# Patient Record
Sex: FEMALE | Race: WHITE | NOT HISPANIC OR LATINO | URBAN - METROPOLITAN AREA
[De-identification: names, ages, dates, MRNs, and addresses within clinical notes are randomized per-mention and may not be internally consistent; named-entity substitution may affect disease eponyms.]

---

## 2019-05-28 ENCOUNTER — ON CAMPUS - OUTPATIENT (AMBULATORY)
Dept: URBAN - METROPOLITAN AREA HOSPITAL 2 | Facility: HOSPITAL | Age: 59
End: 2019-05-28
Payer: COMMERCIAL

## 2019-05-28 VITALS
SYSTOLIC BLOOD PRESSURE: 130 MMHG | DIASTOLIC BLOOD PRESSURE: 84 MMHG | DIASTOLIC BLOOD PRESSURE: 90 MMHG | TEMPERATURE: 97.5 F | RESPIRATION RATE: 16 BRPM | HEART RATE: 72 BPM | HEIGHT: 64 IN | SYSTOLIC BLOOD PRESSURE: 141 MMHG | SYSTOLIC BLOOD PRESSURE: 125 MMHG | HEART RATE: 84 BPM | RESPIRATION RATE: 18 BRPM | DIASTOLIC BLOOD PRESSURE: 73 MMHG | DIASTOLIC BLOOD PRESSURE: 68 MMHG | DIASTOLIC BLOOD PRESSURE: 83 MMHG | HEART RATE: 50 BPM | HEART RATE: 71 BPM | HEART RATE: 81 BPM | OXYGEN SATURATION: 100 % | HEART RATE: 64 BPM | DIASTOLIC BLOOD PRESSURE: 86 MMHG | DIASTOLIC BLOOD PRESSURE: 50 MMHG | RESPIRATION RATE: 19 BRPM | HEART RATE: 69 BPM | SYSTOLIC BLOOD PRESSURE: 153 MMHG | SYSTOLIC BLOOD PRESSURE: 129 MMHG | OXYGEN SATURATION: 99 % | WEIGHT: 178 LBS | SYSTOLIC BLOOD PRESSURE: 97 MMHG

## 2019-05-28 DIAGNOSIS — Z15.09 GENETIC SUSCEPTIBILITY TO OTHER MALIGNANT NEOPLASM: ICD-10-CM

## 2019-05-28 DIAGNOSIS — K64.1 SECOND DEGREE HEMORRHOIDS: ICD-10-CM

## 2019-05-28 PROCEDURE — 45378 DIAGNOSTIC COLONOSCOPY: CPT | Performed by: INTERNAL MEDICINE

## 2019-08-27 ENCOUNTER — OFFICE VISIT (OUTPATIENT)
Dept: CARDIOLOGY | Facility: CLINIC | Age: 59
End: 2019-08-27

## 2019-08-27 VITALS
SYSTOLIC BLOOD PRESSURE: 127 MMHG | OXYGEN SATURATION: 97 % | HEART RATE: 61 BPM | HEIGHT: 64 IN | DIASTOLIC BLOOD PRESSURE: 80 MMHG | BODY MASS INDEX: 31.07 KG/M2 | WEIGHT: 182 LBS | RESPIRATION RATE: 18 BRPM

## 2019-08-27 DIAGNOSIS — E78.2 MIXED HYPERLIPIDEMIA: ICD-10-CM

## 2019-08-27 DIAGNOSIS — I10 ESSENTIAL HYPERTENSION: Primary | ICD-10-CM

## 2019-08-27 PROCEDURE — 99214 OFFICE O/P EST MOD 30 MIN: CPT | Performed by: INTERNAL MEDICINE

## 2019-08-27 PROCEDURE — 93000 ELECTROCARDIOGRAM COMPLETE: CPT | Performed by: INTERNAL MEDICINE

## 2019-08-27 RX ORDER — CETIRIZINE HYDROCHLORIDE 10 MG/1
10 TABLET ORAL DAILY
COMMUNITY

## 2019-08-27 NOTE — PROGRESS NOTES
Subjective:     Encounter Date:08/27/2019      Patient ID: Rochelle Gaspar is a 59 y.o. female.    Chief Complaint:      Dear Dr. uCco Almonte,    It is my pleasure seeing patient Rochelle Gaspar  in the office today.  she is a pleasant 59-year-old female that was hospitalized to the [facility] with a episode of confusion and also  CVA.  patient had a known prior history of DVT and also history of hypercoagulable state and prior history of MI and LV apical thrombus.  repeat echocardiogram this hospitalization did not show any evidence of any LV apical thrombus normal LV systolic function.    she had  neurological workup with a normal CT \ MRI  showed a tiny focal infract.  patient is currently being treated for possible embolic source and anticoagulation.  patient 24 hour Holter monitor did not show any evidence of any atrial fibrillation she was advised to have a event monitor for 3-4 weeks but patient is reluctant.     Patient is going to be on chronic oral anticoagulation because of the hypercoagulable state state with factor 5 Leiden and protein S deficiency    Patient is currently unable to tolerate statins, risk and benefits of long-term oral anticoagulation discussed with the patient  Labs discussed with the patient   significant hyperlipidemia   tried multiple statins in the past with severe intolerance   patient is advised to try a PCSK 9 inhibitor/he likes to wait at this time and try diet and exercise  Repeat lipids with primary care physician office  Patient is also concerned about bleeding issues with anticoagulation therapy  Efforts and alternatives discussed with the patient  Advised patient to avoid any anti-inflammatory medications  Follow-up in office in 6 months          The following portions of the patient's history were reviewed and updated as appropriate: allergies, current medications, past family history, past medical history, past social history, past surgical history and  problem list.    No Known Allergies      Current Outpatient Medications:   •  cetirizine (zyrTEC) 10 MG tablet, Take 10 mg by mouth Daily., Disp: , Rfl:   •  rivaroxaban (XARELTO) 20 MG tablet, Take 20 mg by mouth Daily With Dinner., Disp: , Rfl:     Family History   Problem Relation Age of Onset   • Lung cancer Father    • Clotting disorder Father    • Thyroid disease Sister        Past Medical History:   Diagnosis Date   • [place]    • Blood clotting disorder (CMS/HCC)     Genetic, Factor 5   • DVT (deep venous thrombosis) (CMS/HCC)    • Hyperlipidemia    • Stroke (CMS/HCC)     TIA       Past Surgical History:   Procedure Laterality Date   •  SECTION         Social History     Socioeconomic History   • Marital status:      Spouse name: Not on file   • Number of children: Not on file   • Years of education: Not on file   • Highest education level: Not on file   Tobacco Use   • Smoking status: Never Smoker   • Smokeless tobacco: Never Used   Substance and Sexual Activity   • Alcohol use: Yes     Comment: Rare   • Drug use: No       Review of Systems   Constitution: Negative for chills, decreased appetite and malaise/fatigue.   HENT: Negative for congestion.    Eyes: Negative for blurred vision and double vision.   Cardiovascular: Negative for chest pain, dyspnea on exertion, irregular heartbeat, leg swelling, near-syncope, orthopnea, palpitations, paroxysmal nocturnal dyspnea and syncope.   Respiratory: Negative for cough and shortness of breath.    Hematologic/Lymphatic: Negative for adenopathy. Does not bruise/bleed easily.   Skin: Negative for rash.   Gastrointestinal: Negative for bloating and abdominal pain.   Neurological: Negative for dizziness and focal weakness.            Objective:         Vitals:    19 0833   BP: 127/80   Pulse: 61   Resp: 18   SpO2: 97%       Physical Exam   Constitutional: She is oriented to person, place, and time. She appears well-developed and well-nourished.    HENT:   Head: Normocephalic and atraumatic.   Eyes: Conjunctivae are normal. Pupils are equal, round, and reactive to light.   Neck: Normal range of motion. Neck supple. No thyromegaly present.   Cardiovascular: Normal rate, regular rhythm, S1 normal, S2 normal and intact distal pulses.   Pulmonary/Chest: Effort normal and breath sounds normal.   Abdominal: Soft. Bowel sounds are normal.   Musculoskeletal: She exhibits no edema.   Neurological: She is alert and oriented to person, place, and time.   Skin: Skin is warm.   Nursing note and vitals reviewed.    EKG: normal EKG, normal sinus rhythm, unchanged from previous tracings, normal sinus rhythm, nonspecific ST and T waves changes.

## 2019-09-26 ENCOUNTER — OFFICE VISIT (OUTPATIENT)
Dept: FAMILY MEDICINE CLINIC | Facility: CLINIC | Age: 59
End: 2019-09-26

## 2019-09-26 VITALS
OXYGEN SATURATION: 98 % | BODY MASS INDEX: 31.07 KG/M2 | DIASTOLIC BLOOD PRESSURE: 88 MMHG | SYSTOLIC BLOOD PRESSURE: 133 MMHG | HEART RATE: 61 BPM | WEIGHT: 182 LBS | HEIGHT: 64 IN

## 2019-09-26 DIAGNOSIS — Z12.31 SCREENING MAMMOGRAM, ENCOUNTER FOR: ICD-10-CM

## 2019-09-26 DIAGNOSIS — Z01.419 WELL FEMALE EXAM WITH ROUTINE GYNECOLOGICAL EXAM: Primary | ICD-10-CM

## 2019-09-26 PROBLEM — Z86.73 HISTORY OF CEREBROVASCULAR ACCIDENT: Status: ACTIVE | Noted: 2018-10-23

## 2019-09-26 PROBLEM — I21.9 MYOCARDIAL INFARCTION (HCC): Status: ACTIVE | Noted: 2018-08-23

## 2019-09-26 PROBLEM — E78.5 HYPERLIPIDEMIA: Status: ACTIVE | Noted: 2019-09-26

## 2019-09-26 PROBLEM — Z78.0 POSTMENOPAUSAL: Status: ACTIVE | Noted: 2018-08-23

## 2019-09-26 PROBLEM — Z00.00 ENCOUNTER FOR GENERAL ADULT MEDICAL EXAMINATION WITHOUT ABNORMAL FINDINGS: Status: ACTIVE | Noted: 2018-08-23

## 2019-09-26 PROBLEM — I25.2 HX OF ACUTE MYOCARDIAL INFARCTION: Status: ACTIVE | Noted: 2018-08-23

## 2019-09-26 PROCEDURE — 99396 PREV VISIT EST AGE 40-64: CPT | Performed by: NURSE PRACTITIONER

## 2019-09-26 PROCEDURE — G0148 SCR C/V CYTO, AUTOSYS, RESCR: HCPCS

## 2019-09-26 RX ORDER — MULTIVITAMIN
CAPSULE ORAL
COMMUNITY
Start: 2018-08-23 | End: 2026-09-30

## 2019-09-26 NOTE — PROGRESS NOTES
Subjective   Rochelle Gaspar is a 59 y.o. female.     Patient presents today for pap smear and well woman exam. No hx of abnormal paps and no concerns.       Gynecologic Exam   The patient's pertinent negatives include no genital itching, genital lesions, genital odor, genital rash, missed menses, pelvic pain, vaginal bleeding or vaginal discharge. Pertinent negatives include no abdominal pain, anorexia, back pain, chills, constipation, diarrhea, discolored urine, dysuria, fever, flank pain, frequency, headaches, hematuria, joint pain, joint swelling, nausea, painful intercourse, rash, sore throat, urgency or vomiting. She is sexually active.        The following portions of the patient's history were reviewed and updated as appropriate: allergies, current medications, past family history, past medical history, past social history, past surgical history and problem list.    Review of Systems   Constitutional: Negative for activity change, chills, diaphoresis, fatigue and fever.   HENT: Negative for congestion, ear pain, facial swelling, mouth sores, rhinorrhea, sinus pressure and sore throat.    Eyes: Negative for blurred vision, double vision, photophobia, pain and visual disturbance.   Respiratory: Negative for cough, choking, chest tightness, shortness of breath, wheezing and stridor.    Cardiovascular: Negative for chest pain, palpitations and leg swelling.   Gastrointestinal: Negative for abdominal distention, abdominal pain, anal bleeding, anorexia, blood in stool, constipation, diarrhea, nausea, rectal pain, vomiting, GERD and indigestion.   Endocrine: Negative for polydipsia, polyphagia and polyuria.   Genitourinary: Negative for amenorrhea, breast discharge, breast lump, breast pain, decreased libido, decreased urine volume, difficulty urinating, dyspareunia, dysuria, flank pain, frequency, genital sores, hematuria, menstrual problem, missed menses, pelvic pain, pelvic pressure, urgency, urinary  incontinence, vaginal bleeding, vaginal discharge and vaginal pain.   Musculoskeletal: Negative for arthralgias, back pain, joint pain and neck pain.   Skin: Negative for rash and skin lesions.   Neurological: Negative for dizziness, tremors, weakness, light-headedness and headache.   Psychiatric/Behavioral: Negative for agitation, behavioral problems, depressed mood and stress. The patient is not nervous/anxious.        Objective   Physical Exam   Constitutional: She is oriented to person, place, and time. She appears well-developed and well-nourished. No distress.   HENT:   Head: Normocephalic.   Eyes: Conjunctivae and EOM are normal. Pupils are equal, round, and reactive to light. Right eye exhibits no discharge. Left eye exhibits no discharge.   Neck: Normal range of motion. Neck supple.   Cardiovascular: Normal rate, regular rhythm, normal heart sounds and intact distal pulses. Exam reveals no gallop and no friction rub.   No murmur heard.  Pulmonary/Chest: Effort normal and breath sounds normal. No stridor. No respiratory distress. She has no wheezes. She has no rales. She exhibits no tenderness. Right breast exhibits no inverted nipple, no mass, no nipple discharge, no skin change and no tenderness. Left breast exhibits no inverted nipple, no mass, no nipple discharge, no skin change and no tenderness. Breasts are symmetrical. There is no breast swelling.   Abdominal: Soft. She exhibits no distension. There is no tenderness. A hernia is present.   Genitourinary: Rectum normal, vagina normal, uterus normal and cervix normal. Pelvic exam was performed with patient supine. No labial fusion. There is no rash, tenderness, lesion, injury or Bartholin's cyst on the right labia. There is no rash, tenderness, lesion, injury or Bartholin's cyst on the left labia.   Musculoskeletal: Normal range of motion.   Neurological: She is alert and oriented to person, place, and time.   Skin: Skin is warm and dry. She is not  diaphoretic. No erythema.   Psychiatric: She has a normal mood and affect. Her behavior is normal. Judgment and thought content normal.         Assessment/Plan   Rochelle was seen today for gynecologic exam.    Diagnoses and all orders for this visit:    Well female exam with routine gynecological exam  -     Pap IG, Rfx HPV ASCU; Future  -     Pap IG, Rfx HPV ASCU    Screening mammogram, encounter for  -     Mammo Screening Digital Tomosynthesis Bilateral With CAD

## 2019-10-01 LAB
CHROM ANALY OVERALL INTERP-IMP: NORMAL
CONV .: NORMAL
CONV .: NORMAL
CONV PERFORMED BY:: NORMAL
DX ICD CODE: NORMAL
HIV 1 & 2 AB SER-IMP: NORMAL
REF LAB TEST METHOD: NORMAL
STAT OF ADQ CVX/VAG CYTO-IMP: NORMAL

## 2020-03-03 ENCOUNTER — OFFICE VISIT (OUTPATIENT)
Dept: CARDIOLOGY | Facility: CLINIC | Age: 60
End: 2020-03-03

## 2020-03-03 VITALS
HEART RATE: 85 BPM | SYSTOLIC BLOOD PRESSURE: 141 MMHG | WEIGHT: 184 LBS | BODY MASS INDEX: 31.41 KG/M2 | RESPIRATION RATE: 18 BRPM | OXYGEN SATURATION: 97 % | DIASTOLIC BLOOD PRESSURE: 88 MMHG | HEIGHT: 64 IN

## 2020-03-03 DIAGNOSIS — Z86.73 HISTORY OF CEREBROVASCULAR ACCIDENT: ICD-10-CM

## 2020-03-03 DIAGNOSIS — D68.59 HYPERCOAGULABLE STATE (HCC): ICD-10-CM

## 2020-03-03 DIAGNOSIS — I21.9 MYOCARDIAL INFARCTION, UNSPECIFIED MI TYPE, UNSPECIFIED ARTERY (HCC): Primary | ICD-10-CM

## 2020-03-03 DIAGNOSIS — E78.2 MIXED HYPERLIPIDEMIA: ICD-10-CM

## 2020-03-03 DIAGNOSIS — I63.9 CEREBROVASCULAR ACCIDENT (CVA), UNSPECIFIED MECHANISM (HCC): ICD-10-CM

## 2020-03-03 PROCEDURE — 99213 OFFICE O/P EST LOW 20 MIN: CPT | Performed by: NURSE PRACTITIONER

## 2020-03-03 PROCEDURE — 93000 ELECTROCARDIOGRAM COMPLETE: CPT | Performed by: NURSE PRACTITIONER

## 2020-03-03 NOTE — PROGRESS NOTES
[facility] CARDIOLOGY      REASON FOR FOLLOW-UP:  History of MI  History of LV apical thrombus  Hypercoagulable state with factor 5 Leiden and protein S deficiency  History of CVA        Chief Complaint   Patient presents with   • Hypertension     6 month follow up          Dear Alyssia,    History of Present Illness     It was my pleasure to see Rochelle in the office today.  As you are aware, she is a 60 year-old female with known history of MI, LV apical thrombus, hypercoagulable state, history of CVA, history of DVT.  History of dyslipidemia.  She presents today in follow-up for the above-mentioned diagnostics.    Today, the patient reports that she feels very well.  Specifically, she denies any chest pains, pressure, tightness or palpitations.  She denies any shortness of breath, dyspnea with exertion, orthopnea or PND.  She denies any abnormal bleeding.    Patient is currently unable to tolerate statins.  Recommend patient try a PCSK 9 inhibitor, however she wants to wait at this time and try diet and exercise.    Assessment:  History of MI  History of CVA  Hypercoagulable state  History of LV apical thrombus  Coagulopathy on Xarelto    Plan:  I discussed option of event monitor.  Patient would like to defer at this time  We will get routine surveillance labs including lipids, CBC and BMP  Follow-up in 6 months or sooner if needed.        The following portions of the patient's history were reviewed and updated as appropriate: allergies, current medications, past family history, past medical history, past social history, past surgical history and problem list.    REVIEW OF SYSTEMS:    ROS    Vitals:    03/03/20 1408   BP: 141/88   Pulse: 85   Resp: 18   SpO2: 97%         PHYSICAL EXAM:    General: Alert, cooperative, no distress, appears stated age  Head:  Normocephalic, atraumatic, mucous membranes moist  Eyes:  Conjunctiva/corneas clear, EOM's intact     Neck:  Supple,  no JVD or bruit      Lungs: Clear to auscultation bilaterally, no wheezes rhonchi rales are noted  Chest wall: No tenderness  Musculoskeletal:   Ambulates freely without assistance  Heart::  Regular rate and rhythm, S1 and S2 normal, no murmur, rub or gallop  Abdomen: Soft, non-tender, nondistended bowel sounds active, no abdominal bruit  Extremities: No cyanosis, clubbing, or edema   Pulses: 2+ and symmetric all extremities  Skin:  No rashes or lesions  Neuro/psych: A&O x3. CN II through XII are grossly intact with appropriate affect        Past Medical History:   Diagnosis Date   • [place]    • Blood clotting disorder (CMS/HCC)     Genetic, Factor 5   • DVT (deep venous thrombosis) (CMS/HCC)    • Hyperlipidemia    • Stroke (CMS/HCC)     TIA       Past Surgical History:   Procedure Laterality Date   •  SECTION           Current Outpatient Medications:   •  cetirizine (zyrTEC) 10 MG tablet, Take 10 mg by mouth Daily., Disp: , Rfl:   •  GARLIC PO, GARLIC TABS, Disp: , Rfl:   •  Misc Natural Products (OSTEO BI-FLEX TRIPLE STRENGTH) tablet, OSTEO BI-FLEX TRIPLE STRENGTH TABS, Disp: , Rfl:   •  Multiple Vitamin (MULTIVITAMIN) capsule, MULTIVITAMINS CAPS, Disp: , Rfl:   •  rivaroxaban (XARELTO) 20 MG tablet, Take 1 tablet by mouth Daily With Dinner., Disp: 90 tablet, Rfl: 3  •  Vitamin C-Vitamin D-Zinc (D3/VITAMIN C/ZINC) tablet, D3/VITAMIN C/ZINC TABS, Disp: , Rfl:     No Known Allergies    Family History   Problem Relation Age of Onset   • Lung cancer Father    • Clotting disorder Father    • Thyroid disease Sister        Social History     Tobacco Use   • Smoking status: Never Smoker   • Smokeless tobacco: Never Used   Substance Use Topics   • Alcohol use: Yes     Comment: Rare           Current Electrocardiogram:    ECG 12 Lead  Date/Time: 3/3/2020 3:11 PM  Performed by: Genoveva Morales APRN  Authorized by: Genoveva Morales APRN   Comparison: not compared with previous ECG   Rhythm: sinus rhythm  BPM:  85                Genoveva Morales, BARRY  03/07/20  3:10 PM

## 2020-05-26 ENCOUNTER — OFFICE VISIT (OUTPATIENT)
Dept: FAMILY MEDICINE CLINIC | Facility: CLINIC | Age: 60
End: 2020-05-26

## 2020-05-26 VITALS
DIASTOLIC BLOOD PRESSURE: 89 MMHG | HEIGHT: 64 IN | SYSTOLIC BLOOD PRESSURE: 126 MMHG | BODY MASS INDEX: 32.1 KG/M2 | HEART RATE: 81 BPM | OXYGEN SATURATION: 98 % | WEIGHT: 188 LBS | TEMPERATURE: 97.7 F

## 2020-05-26 DIAGNOSIS — M54.2 NECK PAIN: ICD-10-CM

## 2020-05-26 DIAGNOSIS — Z23 NEED FOR VACCINATION: ICD-10-CM

## 2020-05-26 DIAGNOSIS — M25.511 RIGHT SHOULDER PAIN, UNSPECIFIED CHRONICITY: ICD-10-CM

## 2020-05-26 DIAGNOSIS — E78.2 MIXED HYPERLIPIDEMIA: Primary | ICD-10-CM

## 2020-05-26 PROCEDURE — 90471 IMMUNIZATION ADMIN: CPT | Performed by: FAMILY MEDICINE

## 2020-05-26 PROCEDURE — 90732 PPSV23 VACC 2 YRS+ SUBQ/IM: CPT | Performed by: FAMILY MEDICINE

## 2020-05-26 PROCEDURE — 90472 IMMUNIZATION ADMIN EACH ADD: CPT | Performed by: FAMILY MEDICINE

## 2020-05-26 PROCEDURE — 90715 TDAP VACCINE 7 YRS/> IM: CPT | Performed by: FAMILY MEDICINE

## 2020-05-26 PROCEDURE — 99214 OFFICE O/P EST MOD 30 MIN: CPT | Performed by: FAMILY MEDICINE

## 2020-05-26 NOTE — PROGRESS NOTES
Subjective   Rochelle Gaspar is a 60 y.o. female.     60-year-old female patient present with [facility] care.   The patient complaining of right shoulder pain.  The symptoms  started 4 to 6 months ago.  There was no injury mechanism. The quality of the pain is described as aching. The pain is at a severity of 4/10. The pain is moderate. Pertinent negatives include no chest pain, muscle weakness, numbness or tingling. The symptoms are aggravated by movement and overhead lifting. She has tried NSAIDs for the symptoms.       Neck Pain    This is a chronic problem. The current episode started more than 1 year ago. The problem occurs daily. The problem has been gradually worsening. The pain is present in the right side. The pain is at a severity of 5/10. The pain is moderate. The symptoms are aggravated by position. Pertinent negatives include no chest pain, fever, headaches, leg pain, pain with swallowing, photophobia, trouble swallowing or weakness. She has tried NSAIDs and home exercises for the symptoms. The treatment provided mild relief.   Hyperlipidemia   This is a chronic problem. The problem is controlled. She has no history of diabetes, hypothyroidism or liver disease. Pertinent negatives include no chest pain, leg pain, myalgias or shortness of breath. Current antihyperlipidemic treatment includes diet change and exercise.        The following portions of the patient's history were reviewed and updated as appropriate: past medical history, past social history, past surgical history and problem list.    Review of Systems   Constitutional: Negative for fatigue and fever.   HENT: Negative for ear pain, sinus pressure, sore throat and trouble swallowing.    Eyes: Negative for blurred vision and photophobia.   Respiratory: Negative for cough, shortness of breath and wheezing.    Cardiovascular: Negative for chest pain, palpitations and leg swelling.   Gastrointestinal: Negative for abdominal pain, diarrhea,  nausea, vomiting and GERD.   Endocrine: Negative for cold intolerance, polydipsia, polyphagia and polyuria.   Musculoskeletal: Positive for neck pain. Negative for back pain and myalgias.        Right shoulder pain   Skin: Negative for rash.   Neurological: Negative for dizziness, weakness and headache.   Psychiatric/Behavioral: Negative for sleep disturbance and depressed mood. The patient is not nervous/anxious.        Objective   Physical Exam   Constitutional: She is oriented to person, place, and time. She appears well-developed.   HENT:   Head: Normocephalic.   Eyes: Pupils are equal, round, and reactive to light. Conjunctivae and EOM are normal.   Neck: Normal range of motion. Neck supple.   Cardiovascular: Normal rate, regular rhythm and normal heart sounds.   Pulmonary/Chest: Effort normal and breath sounds normal. She has no wheezes.   Abdominal: Soft. Bowel sounds are normal. There is no tenderness.   Musculoskeletal: Normal range of motion.        Right shoulder: She exhibits normal range of motion and no tenderness.   Neurological: She is alert and oriented to person, place, and time.   Psychiatric: She has a normal mood and affect.   Vitals reviewed.        Assessment/Plan   Problems Addressed this Visit        Cardiovascular and Mediastinum    Hyperlipidemia - Primary     Lipid abnormalities are unchanged.  Nutritional counseling was provided.  Lipids will be reassessed in 6 months.            Nervous and Auditory    Right shoulder pain     Advised NSAIDs, home exercise, we will check shoulder x-ray.         Relevant Orders    XR Shoulder 2+ View Right    Neck pain right side     Discussed symptom management we will check  cervical x-ray.         Relevant Orders    XR Spine Cervical 2 or 3 View       Other    Need for vaccination    Relevant Medications    pneumococcal conj. 13-valent (PREVNAR-13) vaccine 0.5 mL (Completed)    Other Relevant Orders    Tdap Vaccine Greater Than or Equal To 8yo IM  (Completed)

## 2020-06-04 DIAGNOSIS — M50.30 DDD (DEGENERATIVE DISC DISEASE), CERVICAL: ICD-10-CM

## 2020-06-04 DIAGNOSIS — M54.2 NECK PAIN: Primary | ICD-10-CM

## 2020-07-21 ENCOUNTER — TELEPHONE (OUTPATIENT)
Dept: NEUROSURGERY | Facility: CLINIC | Age: 60
End: 2020-07-21

## 2020-07-21 NOTE — TELEPHONE ENCOUNTER
Pt returned Swathi's call about mri. Pt does not have an recent mri and if she needs to be rescheduled with Liyah the office will have to reschedule her since we are not allowed to do so at this time.      Pt contact# [phone]

## 2020-08-17 ENCOUNTER — OFFICE VISIT (OUTPATIENT)
Dept: NEUROSURGERY | Facility: CLINIC | Age: 60
End: 2020-08-17

## 2020-08-17 ENCOUNTER — TELEPHONE (OUTPATIENT)
Dept: NEUROSURGERY | Facility: CLINIC | Age: 60
End: 2020-08-17

## 2020-08-17 DIAGNOSIS — M54.12 CERVICAL RADICULOPATHY: ICD-10-CM

## 2020-08-17 DIAGNOSIS — M47.812 SPONDYLOSIS, CERVICAL: ICD-10-CM

## 2020-08-17 DIAGNOSIS — M54.2 NECK AND SHOULDER PAIN: Primary | ICD-10-CM

## 2020-08-17 DIAGNOSIS — M25.519 NECK AND SHOULDER PAIN: Primary | ICD-10-CM

## 2020-08-17 PROCEDURE — 99442 PR PHYS/QHP TELEPHONE EVALUATION 11-20 MIN: CPT | Performed by: NURSE PRACTITIONER

## 2020-08-17 NOTE — TELEPHONE ENCOUNTER
I called and left a message on patient's voicemail regarding where does she want to go for her mri.

## 2020-08-17 NOTE — PROGRESS NOTES
You have chosen to receive care through a telephone visit. Do you consent to use a telephone visit for your medical care today? Yes      Subjective   History of Present Illness: Rochelle Gaspar is a 60 y.o. female being seen via telehealth today for her neck pain.  Patient is new to clinic and I have reviewed her records.  Patient is a very pleasant 60-year-old that has been having neck pain for approximately 1 year.  She states that this pain is on the right side of her cervical spine and characterizes it as achy.  She states that she also has had some right shoulder pain that she does not know if is related to her neck or not.  Patient is also concerned because she does experience intermittent right hand and finger numbness while she drives.  This is self resolving.  Patient states that she has been undergoing some massages that have been helpful for her neck pain.  She states that she has become concerned since she had x-rays done in May, 2020 and it showed some degenerative issues and wanted to have them checked out.  She denies any bowel/bladder dysfunction, headaches, weakness,  balance issues or gait dysfunction.      Neck Pain    This is a chronic problem. The current episode started more than 1 year ago. The problem occurs constantly. The problem has been gradually improving. The pain is associated with nothing. The pain is present in the right side. The quality of the pain is described as aching. Nothing aggravates the symptoms. Associated symptoms include tingling. Pertinent negatives include no headaches, leg pain, photophobia or weakness. Treatments tried: massages. The treatment provided mild relief.       The following portions of the patient's history were reviewed and updated as appropriate: allergies, current medications, past family history, past medical history, past social history, past surgical history and problem list.    Review of Systems   Eyes: Negative for photophobia.   Musculoskeletal:  Positive for neck pain.   Neurological: Positive for tingling. Negative for weakness and headaches.   All other systems reviewed and are negative.      Objective     .There were no vitals taken for this visit.   There is no height or weight on file to calculate BMI.  No physical assessment performed due to telehealth visit  Patient is alert and oriented x3  Recenct and remote memory intact  Speech is articulate and coherent      Assessment/Plan   Independent Review of Radiographic Studies:      I personally reviewed the images from the following studies.    Cervical x-ray 5/26/2020    1.  Multi Level spondylosis  2.  Degenerative facet changes    Medical Decision Making:      Ms. Gaspar is a 60-year-old female being seen for neck pain via telehealth today.  She has a past medical history significant for DVT, TIA, blood clotting disorder (genetic factor V), and HLD.          Rochelle was seen today for neck pain.    Diagnoses and all orders for this visit:    Neck and shoulder pain    Cervical radiculopathy  -     MRI Cervical Spine Without Contrast; Future    Spondylosis, cervical      Return in about 1 month (around 9/17/2020).        I spent 20 minutes with the patient in direct communication reviewing patient symptoms and complaints and explaining the pathology and the treatment plan.  Patient understands and agrees with plan wishes to proceed.      Liyah Palomino, BARRY  08/17/20  12:30

## 2020-09-01 ENCOUNTER — OFFICE VISIT (OUTPATIENT)
Dept: CARDIOLOGY | Facility: CLINIC | Age: 60
End: 2020-09-01

## 2020-09-01 VITALS
BODY MASS INDEX: 32.1 KG/M2 | SYSTOLIC BLOOD PRESSURE: 132 MMHG | DIASTOLIC BLOOD PRESSURE: 84 MMHG | OXYGEN SATURATION: 98 % | HEART RATE: 73 BPM | WEIGHT: 187 LBS

## 2020-09-01 DIAGNOSIS — D68.59 HYPERCOAGULABLE STATE (HCC): ICD-10-CM

## 2020-09-01 DIAGNOSIS — I63.9 CEREBROVASCULAR ACCIDENT (CVA), UNSPECIFIED MECHANISM (HCC): ICD-10-CM

## 2020-09-01 DIAGNOSIS — I21.9 MYOCARDIAL INFARCTION, UNSPECIFIED MI TYPE, UNSPECIFIED ARTERY (HCC): ICD-10-CM

## 2020-09-01 DIAGNOSIS — I25.2 HX OF ACUTE MYOCARDIAL INFARCTION: Primary | ICD-10-CM

## 2020-09-01 DIAGNOSIS — E78.2 MIXED HYPERLIPIDEMIA: ICD-10-CM

## 2020-09-01 PROCEDURE — 93000 ELECTROCARDIOGRAM COMPLETE: CPT | Performed by: NURSE PRACTITIONER

## 2020-09-01 PROCEDURE — 99213 OFFICE O/P EST LOW 20 MIN: CPT | Performed by: NURSE PRACTITIONER

## 2020-09-01 NOTE — PROGRESS NOTES
[facility] CARDIOLOGY      REASON FOR FOLLOW-UP:  History of non-ST elevation MI  History of TIA  Dyslipidemia  History of LV thrombus            Chief Complaint   Patient presents with   • Hyperlipidemia     6mo f/u no cardiac complaints          Dear Dr. Maynard,        History of Present Illness     It was my pleasure to see Rochelle in the office today.  As you are aware, she is a 60 year-old female with known history of non-STEMI, LV apical thrombus, hypercoagulable state, history of CVA, history of DVT.  History of dyslipidemia.  She presents today in follow-up for the above-mentioned diagnostics.     Today, the patient reports that she feels very well.  Specifically, she denies any chest pains, pressure, tightness or palpitations.  She denies any shortness of breath, dyspnea with exertion, orthopnea or PND.  She denies any abnormal bleeding.    Patient is currently unable to tolerate statins.    PSK 9 where discussed with the patient prior office visit.  She prefers to try diet and exercise.  Patient is anticoagulated with Xarelto and denies any abnormal bleeding.  We again discussed event monitor should she have any palpitations.  She has declined further monitoring in the past.  Patient states she had labs at [facility] after last visit.     Assessment:  History of MI  History of CVA  Hypercoagulable state  History of LV apical thrombus  Coagulopathy on Xarelto     Plan:  Patient would like to defer any further heart monitoring.  We will try to get copies of labs from February  We will order surveillance labs  Follow-up in 6 months or sooner if needed.      The following portions of the patient's history were reviewed and updated as appropriate: allergies, current medications, past family history, past medical history, past social history, past surgical history and problem list.    REVIEW OF SYSTEMS:    Review of Systems   All other systems reviewed and are negative.      Vitals:     20 1317   BP: 132/84   Pulse: 73   SpO2: 98%         PHYSICAL EXAM:    General: Alert, cooperative, no distress, appears stated age  Head:  Normocephalic, atraumatic, mucous membranes moist  Eyes:  Conjunctiva/corneas clear, EOM's intact     Neck:  Supple,  no JVD or bruit     Lungs: Clear to auscultation bilaterally, no wheezes rhonchi rales are noted  Chest wall: No tenderness  Musculoskeletal:   Ambulates freely without assistance  Heart::  Regular rate and rhythm, S1 and S2 normal, no murmur, rub or gallop  Abdomen: Soft, non-tender, nondistended, bowel sounds active, no abdominal bruit  Extremities: No cyanosis, clubbing, or edema   Pulses: 2+ and symmetric all extremities  Skin:  No rashes or lesions  Neuro/psych: A&O x3. CN II through XII are grossly intact with appropriate affect        Past Medical History:   Diagnosis Date   • [place]    • Blood clotting disorder (CMS/HCC)     Genetic, Factor 5   • DVT (deep venous thrombosis) (CMS/HCC)    • Hyperlipidemia    • Stroke (CMS/HCC)     TIA       Past Surgical History:   Procedure Laterality Date   •  SECTION           Current Outpatient Medications:   •  cetirizine (zyrTEC) 10 MG tablet, Take 10 mg by mouth Daily., Disp: , Rfl:   •  GARLIC PO, GARLIC TABS, Disp: , Rfl:   •  Misc Natural Products (OSTEO BI-FLEX TRIPLE STRENGTH) tablet, OSTEO BI-FLEX TRIPLE STRENGTH TABS, Disp: , Rfl:   •  Multiple Vitamin (MULTIVITAMIN) capsule, MULTIVITAMINS CAPS, Disp: , Rfl:   •  rivaroxaban (XARELTO) 20 MG tablet, Take 1 tablet by mouth Daily With Dinner., Disp: 90 tablet, Rfl: 3  •  Vitamin C-Vitamin D-Zinc (D3/VITAMIN C/ZINC) tablet, D3/VITAMIN C/ZINC TABS, Disp: , Rfl:     No Known Allergies    Family History   Problem Relation Age of Onset   • Lung cancer Father    • Clotting disorder Father    • Thyroid disease Sister    • Atrial fibrillation Mother        Social History     Tobacco Use   • Smoking status: Never Smoker   • Smokeless tobacco: Never Used  "  Substance Use Topics   • Alcohol use: Yes     Comment: Rare           Current Electrocardiogram:    ECG 12 Lead  Date/Time: 9/1/2020 2:35 PM  Performed by: Genoveva Morales APRN  Authorized by: Genoveva Morales APRN   Comparison: not compared with previous ECG   Rhythm: sinus rhythm  BPM: 73  Conduction comments: Nonspecific ST wave changes                  EMR Dragon/Transcription:   \"Dictated utilizing Dragon dictation\".         "

## 2020-09-09 ENCOUNTER — TELEPHONE (OUTPATIENT)
Dept: NEUROSURGERY | Facility: CLINIC | Age: 60
End: 2020-09-09

## 2020-09-09 NOTE — TELEPHONE ENCOUNTER
PT IS CALLING BACK TO LET YOU KNOW THAT SHE DID HER MRI CERVICAL AT [facility] ON 8/28.  SUZETTE [phone]

## 2020-09-30 ENCOUNTER — TELEPHONE (OUTPATIENT)
Dept: NEUROSURGERY | Facility: CLINIC | Age: 60
End: 2020-09-30

## 2021-01-25 ENCOUNTER — OFFICE VISIT (OUTPATIENT)
Dept: NEUROSURGERY | Facility: CLINIC | Age: 61
End: 2021-01-25

## 2021-01-25 VITALS
BODY MASS INDEX: 32.44 KG/M2 | HEART RATE: 101 BPM | SYSTOLIC BLOOD PRESSURE: 141 MMHG | HEIGHT: 64 IN | DIASTOLIC BLOOD PRESSURE: 88 MMHG | WEIGHT: 190 LBS

## 2021-01-25 DIAGNOSIS — M25.519 NECK AND SHOULDER PAIN: Primary | ICD-10-CM

## 2021-01-25 DIAGNOSIS — M48.02 SPINAL STENOSIS IN CERVICAL REGION: ICD-10-CM

## 2021-01-25 DIAGNOSIS — M54.2 NECK AND SHOULDER PAIN: Primary | ICD-10-CM

## 2021-01-25 DIAGNOSIS — M47.812 FACET ARTHROPATHY, CERVICAL: ICD-10-CM

## 2021-01-25 PROCEDURE — 99213 OFFICE O/P EST LOW 20 MIN: CPT | Performed by: NURSE PRACTITIONER

## 2021-01-25 NOTE — PROGRESS NOTES
Subjective   History of Present Illness: Rochelle Gaspar is a 60 y.o. female being seen for follow-up of her neck pain with radiation in her right shoulder intermittently as well as numbness and tingling to her right hand and fingers while she drives.    Patient actually states that her symptoms have eased quite a bit.  She has been doing yoga which is helped with some of the tightness and neck pain.  She has no shoulder pain or numbness and tingling in her right arm.  She has some neck pain mostly on the right that she feels tightens up her muscles at times.    Neck Pain   This is a chronic problem. The current episode started more than 1 year ago. The problem occurs intermittently. The problem has been unchanged. The pain is associated with nothing. The pain is present in the right side, midline and left side. The quality of the pain is described as aching. The pain is mild. Nothing aggravates the symptoms. Pertinent negatives include no chest pain, fever, headaches, leg pain, numbness, photophobia, tingling, trouble swallowing, visual change or weakness. She has tried nothing for the symptoms.       The following portions of the patient's history were reviewed and updated as appropriate: allergies, current medications, past family history, past medical history, past social history, past surgical history and problem list.    Review of Systems   Constitutional: Negative for chills, fatigue and fever.   HENT: Positive for congestion. Negative for postnasal drip, sinus pressure, sinus pain, sneezing, sore throat and trouble swallowing.    Eyes: Negative for photophobia, pain, redness and visual disturbance.   Respiratory: Negative for cough, chest tightness, shortness of breath and wheezing.    Cardiovascular: Negative for chest pain and palpitations.   Gastrointestinal: Negative for abdominal pain, constipation, diarrhea, nausea and vomiting.   Genitourinary: Negative for difficulty urinating and pelvic pain.  "  Musculoskeletal: Positive for neck pain.   Skin: Negative for rash and wound.   Neurological: Negative for dizziness, tingling, tremors, seizures, syncope, weakness, light-headedness, numbness and headaches.   Psychiatric/Behavioral: Negative for behavioral problems, confusion, decreased concentration and hallucinations. The patient is not nervous/anxious.    All other systems reviewed and are negative.      Objective     ./88 (BP Location: Left arm, Patient Position: Sitting, Cuff Size: Adult)   Pulse 101   Ht 162.6 cm (64\")   Wt 86.2 kg (190 lb)   BMI 32.61 kg/m²    Body mass index is 32.61 kg/m².      Physical Exam  Vitals signs reviewed.   Eyes:      Pupils: Pupils are equal, round, and reactive to light.   Pulmonary:      Effort: Pulmonary effort is normal.   Neurological:      Mental Status: She is oriented to person, place, and time.      Coordination: Romberg Test normal.      Gait: Gait is intact.      Deep Tendon Reflexes: Strength normal.   Psychiatric:         Speech: Speech normal.       Neurologic Exam     Mental Status   Oriented to person, place, and time.   Speech: speech is normal   Level of consciousness: alert    Cranial Nerves     CN III, IV, VI   Pupils are equal, round, and reactive to light.    CN XI   CN XI normal.     Motor Exam     Strength   Strength 5/5 throughout.     Sensory Exam   Light touch normal.     Gait, Coordination, and Reflexes     Gait  Gait: normal    Coordination   Romberg: negative    Reflexes   Right plantar: normal  Left plantar: normal  Right Cooper: absent  Left Cooper: absent  Right ankle clonus: absent  Left ankle clonus: absent    Normal range of motion  Tender to palpation right trapezius  Negative Lhermitte's  Negative Spurling      Assessment/Plan   Independent Review of Radiographic Studies:      I personally reviewed the images from the following studies.    MRI cervical 8/28/2020    Mild multilevel degenerative changes most noted at C3-C4, " C4-C5, C5-C6.  These areas demonstrate mild bilateral facet arthropathy with no spinal canal stenosis and mild bilateral neuroforaminal stenosis at C5-C6 and mild right neuroforaminal stenosis C3-C4.    Medical Decision Making:    Patient clinical symptoms do correlate with facet arthropathy in her neck.  I recommended she continue with her yoga as it is helping.  Patient did not want to be placed on a muscle relaxer and cannot be placed on anti-inflammatory medication due to being on blood thinners.  She does not like taking prescription pain medication.  We did talk about some supplements to help reduce inflammation but told her to check with her primary care physician before starting any due to some side effects of some of these herbal supplements.  I also suggested patient apply some heat around her neck and shoulder area if she feels some tightness causing pain.  Patient can follow-up with us on as needed basis.      Procedures      Diagnoses and all orders for this visit:    1. Neck and shoulder pain (Primary)    2. Facet arthropathy, cervical    3. Spinal stenosis in cervical region      Return if symptoms worsen or fail to improve.    This patient was examined wearing appropriate personal protective equipment.     Patient has never used tobacco products. Discussed continued cessation/avoidance of tobacco products.    Smoking increases the risk of heart disease, lung disease, vascular disease, stroke, and cancer. If you smoke, STOP!    I did discuss a low-salt diet and exercise to improve BP in addition to taking presribed medications.    Patient's BMI is above goal.  I discussed healthy, low-fat diet and exercise to improve overall health and wellbeing.         Liyah Palomino, APRN  01/25/21  09:49 EST

## 2021-03-08 ENCOUNTER — OFFICE VISIT (OUTPATIENT)
Dept: CARDIOLOGY | Facility: CLINIC | Age: 61
End: 2021-03-08

## 2021-03-08 VITALS
WEIGHT: 185 LBS | SYSTOLIC BLOOD PRESSURE: 137 MMHG | HEART RATE: 65 BPM | DIASTOLIC BLOOD PRESSURE: 87 MMHG | BODY MASS INDEX: 31.76 KG/M2 | OXYGEN SATURATION: 99 %

## 2021-03-08 DIAGNOSIS — I25.2 HX OF ACUTE MYOCARDIAL INFARCTION: Primary | ICD-10-CM

## 2021-03-08 DIAGNOSIS — D68.59 HYPERCOAGULABLE STATE (HCC): ICD-10-CM

## 2021-03-08 DIAGNOSIS — E78.2 MIXED HYPERLIPIDEMIA: ICD-10-CM

## 2021-03-08 DIAGNOSIS — Z86.73 HISTORY OF CEREBROVASCULAR ACCIDENT: ICD-10-CM

## 2021-03-08 PROCEDURE — 99213 OFFICE O/P EST LOW 20 MIN: CPT | Performed by: NURSE PRACTITIONER

## 2021-03-08 PROCEDURE — 93000 ELECTROCARDIOGRAM COMPLETE: CPT | Performed by: NURSE PRACTITIONER

## 2021-03-08 NOTE — PROGRESS NOTES
[facility] CARDIOLOGY      REASON FOR FOLLOW-UP:  History of non-ST elevation MI  History of TIA  Dyslipidemia  History of LV thrombus          Chief Complaint   Patient presents with   • Hyperlipidemia     f/u no cardiac complaints         Dear Dr. Maynard,        History of Present Illness     It was my pleasure to see Rochelle in the office today.  As you are aware, she is a 61 year-old female with known history of non-STEMI, LV apical thrombus, hypercoagulable state, history of CVA, history of DVT.  History of dyslipidemia.  She presents today in follow-up for the above-mentioned diagnostics.     Today, the patient reports that she feels very well.  Specifically, she denies any chest pains, pressure, tightness or palpitations.  She denies any shortness of breath, dyspnea with exertion, orthopnea or PND.  She denies any abnormal bleeding.   Patient is currently unable to tolerate statins.    PSK 9 where discussed with the patient prior office visit.  She prefers to try diet and exercise.  Patient is anticoagulated with Xarelto and denies any abnormal bleeding.    She has received her first COVID-19 vaccination.     Assessment:  History of MI  History of CVA  Hypercoagulable state  History of LV apical thrombus  Coagulopathy on Xarelto      Plan:  Surveillance labs  Will notify patient for any significant outliers  Continue current medical plan  Follow-up in 6 months        The following portions of the patient's history were reviewed and updated as appropriate: allergies, current medications, past family history, past medical history, past social history, past surgical history and problem list.    REVIEW OF SYSTEMS:    Review of Systems   All other systems reviewed and are negative.      Vitals:    03/08/21 1321   BP: 137/87   Pulse: 65   SpO2: 99%         PHYSICAL EXAM:    General: Alert, cooperative, no distress, appears stated age  Head:  Normocephalic, atraumatic, mucous membranes  moist  Eyes:  Conjunctiva/corneas clear, EOM's intact     Neck:  Supple,  no JVD or bruit     Lungs: Clear to auscultation bilaterally, no wheezes rhonchi rales are noted  Chest wall: No tenderness  Musculoskeletal:   Ambulates freely without assistance  Heart::  Regular rate and rhythm, S1 and S2 normal, no murmur, rub or gallop  Abdomen: Soft, non-tender, nondistended, bowel sounds active, no abdominal bruit  Extremities: No cyanosis, clubbing, or edema   Pulses: 2+ and symmetric all extremities  Skin:  No rashes or lesions  Neuro/psych: A&O x3. CN II through XII are grossly intact with appropriate affect        Past Medical History:   Diagnosis Date   • [place]    • Blood clotting disorder (CMS/HCC)     Genetic, Factor 5   • DVT (deep venous thrombosis) (CMS/HCC)    • Hyperlipidemia    • Stroke (CMS/HCC)     TIA       Past Surgical History:   Procedure Laterality Date   •  SECTION           Current Outpatient Medications:   •  cetirizine (zyrTEC) 10 MG tablet, Take 10 mg by mouth Daily., Disp: , Rfl:   •  Misc Natural Products (OSTEO BI-FLEX TRIPLE STRENGTH) tablet, OSTEO BI-FLEX TRIPLE STRENGTH TABS, Disp: , Rfl:   •  Multiple Vitamin (MULTIVITAMIN) capsule, MULTIVITAMINS CAPS, Disp: , Rfl:   •  rivaroxaban (XARELTO) 20 MG tablet, Take 1 tablet by mouth Daily With Dinner., Disp: 90 tablet, Rfl: 3  •  Vitamin C-Vitamin D-Zinc (D3/VITAMIN C/ZINC) tablet, D3/VITAMIN C/ZINC TABS, Disp: , Rfl:   •  GARLIC PO, GARLIC TABS, Disp: , Rfl:     No Known Allergies    Family History   Problem Relation Age of Onset   • Lung cancer Father    • Clotting disorder Father    • Thyroid disease Sister    • Atrial fibrillation Mother        Social History     Tobacco Use   • Smoking status: Never Smoker   • Smokeless tobacco: Never Used   Substance Use Topics   • Alcohol use: Yes     Comment: Rare           Current Electrocardiogram:    ECG 12 Lead    Date/Time: 3/8/2021 4:33 PM  Performed by: Genoveva Morales  "APRN  Authorized by: Genoveva Morales, BARRY   Comparison: not compared with previous ECG   Rhythm: sinus rhythm  BPM: 65                  EMR Dragon/Transcription:   \"Dictated utilizing Dragon dictation\".     Disclaimer: Please note that areas of this note were completed with computer voice recognition software.  Quite often unanticipated grammatical, syntax, homophones, and other interpretive errors are inadvertently transcribed by the computer software. Please excuse any errors that have escaped final proofreading    "

## 2021-03-09 PROBLEM — I21.9 MYOCARDIAL INFARCTION (HCC): Status: RESOLVED | Noted: 2018-08-23 | Resolved: 2021-03-09

## 2021-04-15 NOTE — TELEPHONE ENCOUNTER
PATIENT CALLED REQUESTING TO RESCHEDULE APPT FOR MRI RESULTS. PATIENT PREFERS TO HAVE A TELEVISIT. UNABLE TO SCHEDULE VISIT TYPE PREFERENCE. PATIENT COMPLETED MRI AT [facility] ON 8/28/20. NO REC OF REPORT IN CHART AT TIME OF CALL.    PATIENT CAN BE CONTACTED [phone] WITH AN UPDATE  
Reschedule please
144

## 2021-08-23 ENCOUNTER — OFFICE VISIT (OUTPATIENT)
Dept: CARDIOLOGY | Facility: CLINIC | Age: 61
End: 2021-08-23

## 2021-08-23 VITALS
OXYGEN SATURATION: 98 % | DIASTOLIC BLOOD PRESSURE: 83 MMHG | WEIGHT: 186 LBS | BODY MASS INDEX: 31.93 KG/M2 | SYSTOLIC BLOOD PRESSURE: 149 MMHG | HEART RATE: 64 BPM

## 2021-08-23 DIAGNOSIS — E78.2 MIXED HYPERLIPIDEMIA: Primary | ICD-10-CM

## 2021-08-23 DIAGNOSIS — Z86.73 HISTORY OF CEREBROVASCULAR ACCIDENT: ICD-10-CM

## 2021-08-23 DIAGNOSIS — I51.3 LV (LEFT VENTRICULAR) MURAL THROMBUS: ICD-10-CM

## 2021-08-23 DIAGNOSIS — D68.59 HYPERCOAGULABLE STATE (HCC): ICD-10-CM

## 2021-08-23 PROCEDURE — 99213 OFFICE O/P EST LOW 20 MIN: CPT | Performed by: NURSE PRACTITIONER

## 2021-08-23 NOTE — PROGRESS NOTES
[facility] CARDIOLOGY      REASON FOR FOLLOW-UP:  History of non-ST elevation MI  History of TIA  Dyslipidemia  History of LV thrombus  Coagulopathy        Chief Complaint   Patient presents with   • Hyperlipidemia     6 mo f/u  no complaints         Dear Saad Maynard MD        History of Present Illness     It was my pleasure to see Rochelle in the office today.  As you are aware, she is a 61 year-old female with known history of non-STEMI, LV apical thrombus, hypercoagulable state, history of CVA, history of DVT, hypercoagulable state and history of dyslipidemia.  She presents today in follow-up for the above-mentioned diagnostics.     Today, the patient reports that she feels very well.  Specifically, she denies any chest pains, pressure, tightness or palpitations.  She denies any shortness of breath, dyspnea with exertion, orthopnea or PND.  She denies any abnormal bleeding.   Patient is currently unable to tolerate statins.  PSK 9 has been discussed with the patient in the past, however she prefers to try diet and exercise.  Patient is anticoagulated with Xarelto and denies any abnormal bleeding.    She has completed COVID-19 vaccination.  Recent lipids reviewed-triglycerides up from prior at 177, LDL improved at 127.    ASSESSMENT:  History of MI  History of CVA  Hypercoagulable state  History of LV apical thrombus  Coagulopathy on Xarelto        PLAN:  Continue current CV plan of care  Follow-up in 1 years time or sooner if needed        The following portions of the patient's history were reviewed and updated as appropriate: allergies, current medications, past family history, past medical history, past social history, past surgical history and problem list.    REVIEW OF SYSTEMS:    Review of Systems   All other systems reviewed and are negative.      Vitals:    08/23/21 1503   BP: 149/83   Pulse: 64   SpO2: 98%         PHYSICAL EXAM:    General: Alert, cooperative, no distress,  appears stated age  Head:  Normocephalic, atraumatic, mucous membranes moist  Eyes:  Conjunctiva/corneas clear, EOM's intact     Neck:  Supple,  no JVD or bruit     Lungs: Clear to auscultation bilaterally, no wheezes rhonchi rales are noted  Chest wall: No tenderness  Musculoskeletal:   Ambulates freely without assistance  Heart::  Regular rate and rhythm, S1 and S2 normal, no murmur, rub or gallop  Abdomen: Soft, non-tender, nondistended, bowel sounds active, no abdominal bruit  Extremities: No cyanosis, clubbing, or edema   Pulses: 2+ and symmetric all extremities  Skin:  No rashes or lesions  Neuro/psych: A&O x3. CN II through XII are grossly intact with appropriate affect        Past Medical History:   Diagnosis Date   • [place]    • Blood clotting disorder (CMS/HCC)     Genetic, Factor 5   • DVT (deep venous thrombosis) (CMS/HCC)    • Hyperlipidemia    • Stroke (CMS/HCC)     TIA       Past Surgical History:   Procedure Laterality Date   •  SECTION           Current Outpatient Medications:   •  cetirizine (zyrTEC) 10 MG tablet, Take 10 mg by mouth Daily., Disp: , Rfl:   •  Misc Natural Products (OSTEO BI-FLEX TRIPLE STRENGTH) tablet, OSTEO BI-FLEX TRIPLE STRENGTH TABS, Disp: , Rfl:   •  Multiple Vitamin (MULTIVITAMIN) capsule, MULTIVITAMINS CAPS, Disp: , Rfl:   •  rivaroxaban (XARELTO) 20 MG tablet, Take 1 tablet by mouth Daily With Dinner., Disp: 90 tablet, Rfl: 3  •  Vitamin C-Vitamin D-Zinc (D3/VITAMIN C/ZINC) tablet, D3/VITAMIN C/ZINC TABS, Disp: , Rfl:   •  GARLIC PO, GARLIC TABS, Disp: , Rfl:     No Known Allergies    Family History   Problem Relation Age of Onset   • Lung cancer Father    • Clotting disorder Father    • Thyroid disease Sister    • Atrial fibrillation Mother        Social History     Tobacco Use   • Smoking status: Never Smoker   • Smokeless tobacco: Never Used   Substance Use Topics   • Alcohol use: Yes     Comment: Rare           Current  "Electrocardiogram:  Procedures        EMR Dragon/Transcription:   \"Dictated utilizing Dragon dictation\".       "

## 2021-12-06 ENCOUNTER — TELEPHONE (OUTPATIENT)
Dept: FAMILY MEDICINE CLINIC | Facility: CLINIC | Age: 61
End: 2021-12-06

## 2021-12-06 DIAGNOSIS — Z12.31 SCREENING MAMMOGRAM FOR BREAST CANCER: Primary | ICD-10-CM

## 2021-12-06 NOTE — TELEPHONE ENCOUNTER
Caller: Rochelle Gaspar    Relationship: Self    Best call back number: [phone]       What orders are you requesting (i.e. lab or imaging):     ORDERS FOR A MAMAMMOGRAM    In what timeframe would the patient need to come in:     BEFORE December 16, 2021    Where will you receive your lab/imaging services:    [facility]       Additional notes:       PATIENT IS HOPING TO GET HER MAMMOGRAM DONE BEFORE December 16, 2021 OR AT LEAST BY December 17, 2021 DUE TO TIME CONSTRAINTS.      PLEASE CONTACT PATIENT AND ADVISE....    THANK YOU.....

## 2021-12-06 NOTE — TELEPHONE ENCOUNTER
Mammogram order placed. please fax to [facility] and inform patient to call and set up appointment for mammo-

## 2021-12-16 ENCOUNTER — OFFICE VISIT (OUTPATIENT)
Dept: FAMILY MEDICINE CLINIC | Facility: CLINIC | Age: 61
End: 2021-12-16

## 2021-12-16 VITALS
HEIGHT: 64 IN | HEART RATE: 70 BPM | DIASTOLIC BLOOD PRESSURE: 87 MMHG | TEMPERATURE: 97.8 F | BODY MASS INDEX: 32.06 KG/M2 | SYSTOLIC BLOOD PRESSURE: 131 MMHG | WEIGHT: 187.8 LBS | OXYGEN SATURATION: 95 %

## 2021-12-16 DIAGNOSIS — Z00.00 ENCOUNTER FOR WELL ADULT EXAM WITHOUT ABNORMAL FINDINGS: Primary | ICD-10-CM

## 2021-12-16 DIAGNOSIS — E78.2 MIXED HYPERLIPIDEMIA: ICD-10-CM

## 2021-12-16 DIAGNOSIS — Z11.59 ENCOUNTER FOR HEPATITIS C SCREENING TEST FOR LOW RISK PATIENT: ICD-10-CM

## 2021-12-16 PROCEDURE — 99396 PREV VISIT EST AGE 40-64: CPT | Performed by: FAMILY MEDICINE

## 2021-12-16 NOTE — PROGRESS NOTES
Subjective   Rochelle Gaspar is a 61 y.o. female.     History of Present Illness   The patient comes in today for annual physical.  The patient is doing well denies fatigue, cold intolerance, headache, cough, chest pain, palpitations, dizziness and SOB. The patient admits to dietary compliance is  fair  and exercising occasionally.  She is a non-smoker.      The following portions of the patient's history were reviewed and updated as appropriate: past medical history, past social history, past surgical history and problem list.    Review of Systems   Constitutional: Negative for fatigue and fever.   HENT: Negative for ear pain, sinus pressure and sore throat.    Eyes: Negative for blurred vision.   Respiratory: Negative for cough, shortness of breath and wheezing.    Cardiovascular: Negative for chest pain, palpitations and leg swelling.   Gastrointestinal: Negative for abdominal pain, diarrhea, nausea and vomiting.   Endocrine: Negative for cold intolerance, polydipsia, polyphagia and polyuria.   Musculoskeletal: Negative for back pain.   Neurological: Negative for dizziness and headache.   Psychiatric/Behavioral: Negative for depressed mood. The patient is not nervous/anxious.        Objective   Physical Exam  Vitals reviewed.   Constitutional:       General: She is not in acute distress.     Appearance: She is well-developed.   Neck:      Thyroid: No thyromegaly.   Cardiovascular:      Rate and Rhythm: Normal rate.      Pulses: Normal pulses.      Heart sounds: Normal heart sounds.   Pulmonary:      Effort: Pulmonary effort is normal.      Breath sounds: Normal breath sounds. No wheezing.   Abdominal:      General: Bowel sounds are normal.      Palpations: Abdomen is soft.      Tenderness: There is no abdominal tenderness.   Musculoskeletal:         General: Normal range of motion.      Cervical back: Normal range of motion. No tenderness.   Neurological:      Mental Status: She is alert and oriented to person,  place, and time.   Psychiatric:         Mood and Affect: Mood normal.       Vitals:    12/16/21 1247   BP: 131/87   Pulse: 70   Temp: 97.8 °F (36.6 °C)   SpO2: 95%     Current Outpatient Medications on File Prior to Visit   Medication Sig Dispense Refill   • cetirizine (zyrTEC) 10 MG tablet Take 10 mg by mouth Daily.     • Misc Natural Products (OSTEO BI-FLEX TRIPLE STRENGTH) tablet OSTEO BI-FLEX TRIPLE STRENGTH TABS     • Multiple Vitamin (MULTIVITAMIN) capsule MULTIVITAMINS CAPS     • rivaroxaban (XARELTO) 20 MG tablet Take 1 tablet by mouth Daily With Dinner. 90 tablet 3   • Vitamin C-Vitamin D-Zinc (D3/VITAMIN C/ZINC) tablet D3/VITAMIN C/ZINC TABS       No current facility-administered medications on file prior to visit.           Assessment/Plan   Problems Addressed this Visit        Cardiac and Vasculature    Mixed hyperlipidemia    Relevant Orders    Lipid panel    Comprehensive metabolic panel    TSH    CBC w AUTO Differential       Health Encounters    Encounter for well adult exam without abnormal findings - Primary     Discussed healthy diet and  importance of regular exercise and recommend starting or continuing a regular exercise program for good health.  Stressed importance of moderation in sodium/caffeine intake, saturated fat and cholesterol, caloric balance, sufficient intake of fresh fruits, vegetables, fiber, calcium and iron.           Relevant Orders    Lipid panel    Comprehensive metabolic panel    TSH    CBC w AUTO Differential       Infectious Diseases    Encounter for hepatitis C screening test for low risk patient    Relevant Orders    Hepatitis C antibody      Diagnoses       Codes Comments    Encounter for well adult exam without abnormal findings    -  Primary ICD-10-CM: Z00.00  ICD-9-CM: V70.0     Mixed hyperlipidemia     ICD-10-CM: E78.2  ICD-9-CM: 272.2     Encounter for hepatitis C screening test for low risk patient     ICD-10-CM: Z11.59  ICD-9-CM: V73.89

## 2021-12-18 LAB
ALBUMIN SERPL-MCNC: 4.6 G/DL (ref 3.8–4.9)
ALBUMIN/GLOB SERPL: 1.8 {RATIO} (ref 1.2–2.2)
ALP SERPL-CCNC: 47 IU/L (ref 44–121)
ALT SERPL-CCNC: 20 IU/L (ref 0–32)
AMBIG ABBREV CMP14 DEFAULT: NORMAL
AMBIG ABBREV LP DEFAULT: NORMAL
AST SERPL-CCNC: 21 IU/L (ref 0–40)
BASOPHILS # BLD AUTO: 0.1 X10E3/UL (ref 0–0.2)
BASOPHILS NFR BLD AUTO: 1 %
BILIRUB SERPL-MCNC: 0.3 MG/DL (ref 0–1.2)
BUN SERPL-MCNC: 19 MG/DL (ref 6–24)
BUN/CREAT SERPL: 19 (ref 9–23)
CALCIUM SERPL-MCNC: 9.2 MG/DL (ref 8.7–10.2)
CHLORIDE SERPL-SCNC: 102 MMOL/L (ref 96–106)
CHOLEST SERPL-MCNC: 259 MG/DL (ref 100–199)
CO2 SERPL-SCNC: 23 MMOL/L (ref 20–29)
CREAT SERPL-MCNC: 1.02 MG/DL (ref 0.57–1)
EOSINOPHIL # BLD AUTO: 0.1 X10E3/UL (ref 0–0.4)
EOSINOPHIL NFR BLD AUTO: 1 %
ERYTHROCYTE [DISTWIDTH] IN BLOOD BY AUTOMATED COUNT: 12.3 % (ref 11.7–15.4)
GLOBULIN SER CALC-MCNC: 2.6 G/DL (ref 1.5–4.5)
GLUCOSE SERPL-MCNC: 87 MG/DL (ref 65–99)
HCT VFR BLD AUTO: 39.1 % (ref 34–46.6)
HCV AB S/CO SERPL IA: <0.1 S/CO RATIO (ref 0–0.9)
HDLC SERPL-MCNC: 61 MG/DL
HGB BLD-MCNC: 13.4 G/DL (ref 11.1–15.9)
IMM GRANULOCYTES # BLD AUTO: 0 X10E3/UL (ref 0–0.1)
IMM GRANULOCYTES NFR BLD AUTO: 0 %
LDLC SERPL CALC-MCNC: 172 MG/DL (ref 0–99)
LYMPHOCYTES # BLD AUTO: 2.8 X10E3/UL (ref 0.7–3.1)
LYMPHOCYTES NFR BLD AUTO: 38 %
MCH RBC QN AUTO: 32.3 PG (ref 26.6–33)
MCHC RBC AUTO-ENTMCNC: 34.3 G/DL (ref 31.5–35.7)
MCV RBC AUTO: 94 FL (ref 79–97)
MONOCYTES # BLD AUTO: 0.6 X10E3/UL (ref 0.1–0.9)
MONOCYTES NFR BLD AUTO: 8 %
NEUTROPHILS # BLD AUTO: 3.8 X10E3/UL (ref 1.4–7)
NEUTROPHILS NFR BLD AUTO: 52 %
PLATELET # BLD AUTO: 242 X10E3/UL (ref 150–450)
POTASSIUM SERPL-SCNC: 4.4 MMOL/L (ref 3.5–5.2)
PROT SERPL-MCNC: 7.2 G/DL (ref 6–8.5)
RBC # BLD AUTO: 4.15 X10E6/UL (ref 3.77–5.28)
SODIUM SERPL-SCNC: 141 MMOL/L (ref 134–144)
TRIGL SERPL-MCNC: 146 MG/DL (ref 0–149)
TSH SERPL DL<=0.005 MIU/L-ACNC: 2.45 UIU/ML (ref 0.45–4.5)
VLDLC SERPL CALC-MCNC: 26 MG/DL (ref 5–40)
WBC # BLD AUTO: 7.4 X10E3/UL (ref 3.4–10.8)

## 2021-12-23 RX ORDER — ATORVASTATIN CALCIUM 10 MG/1
10 TABLET, FILM COATED ORAL DAILY
Qty: 30 TABLET | Refills: 3 | Status: SHIPPED | OUTPATIENT
Start: 2021-12-23 | End: 2022-08-29

## 2022-08-22 ENCOUNTER — OFFICE VISIT (OUTPATIENT)
Dept: CARDIOLOGY | Facility: CLINIC | Age: 62
End: 2022-08-22

## 2022-08-22 VITALS
WEIGHT: 195 LBS | BODY MASS INDEX: 33.29 KG/M2 | OXYGEN SATURATION: 98 % | HEIGHT: 64 IN | SYSTOLIC BLOOD PRESSURE: 116 MMHG | DIASTOLIC BLOOD PRESSURE: 67 MMHG | HEART RATE: 93 BPM

## 2022-08-22 DIAGNOSIS — E78.2 MIXED HYPERLIPIDEMIA: ICD-10-CM

## 2022-08-22 DIAGNOSIS — I25.2 HX OF ACUTE MYOCARDIAL INFARCTION: Primary | ICD-10-CM

## 2022-08-22 DIAGNOSIS — D68.59 HYPERCOAGULABLE STATE: ICD-10-CM

## 2022-08-22 DIAGNOSIS — I51.3 LV (LEFT VENTRICULAR) MURAL THROMBUS: ICD-10-CM

## 2022-08-22 PROCEDURE — 99213 OFFICE O/P EST LOW 20 MIN: CPT | Performed by: NURSE PRACTITIONER

## 2022-08-22 PROCEDURE — 93000 ELECTROCARDIOGRAM COMPLETE: CPT | Performed by: NURSE PRACTITIONER

## 2022-08-22 RX ORDER — ICOSAPENT ETHYL 1000 MG/1
1 CAPSULE ORAL 2 TIMES DAILY WITH MEALS
Qty: 120 CAPSULE | Refills: 3 | Status: SHIPPED | OUTPATIENT
Start: 2022-08-22

## 2022-08-22 NOTE — PROGRESS NOTES
[facility] CARDIOLOGY      REASON FOR FOLLOW-UP:  History of non-ST elevation MI  History of TIA  Dyslipidemia  History of LV thrombus  Coagulopathy          Chief Complaint   Patient presents with   • Hyperlipidemia         Dear Saad Maynard MD        History of Present Illness   It was my pleasure to see Rochelle in the office today.  As you are aware, she is a 62 year-old female with known history of non-STEMI, LV apical thrombus, hypercoagulable state, history of CVA, history of DVT and history of dyslipidemia. She presents today in follow-up for the above-mentioned diagnostics.    Today, Rochelle reports that she feels well.  She specifically denies any complaints of chest pains, pressure, tightness or palpitations.  She denies any shortness of breath at rest, dyspnea with exertion, orthopnea or PND.  No lower extremity edema, dizziness, lightheadedness, near syncopal or syncopal episodes.  Lipids reviewed from December 2021 with elevated triglycerides at 259, .  She is currently not on statin therapy due to side effects.  We discussed options with fewer side effects including Vascepa.      ASSESSMENT:  History of MI  History of CVA  Hypercoagulable state  History of LV apical thrombus  Coagulopathy on Xarelto        PLAN:  Start Vascepa 1 g twice daily.  Recheck lipids in 8 weeks.  Continue risk factor modification including heart healthy diet, exercise  I will refill Xarelto today  Continue current CV plan of care  Follow-up in 6 months or sooner if needed        The following portions of the patient's history were reviewed and updated as appropriate: allergies, current medications, past family history, past medical history, past social history, past surgical history and problem list.    REVIEW OF SYSTEMS:    Review of Systems   All other systems reviewed and are negative.      Vitals:    08/22/22 1354   BP: 116/67   Pulse: 93   SpO2: 98%         PHYSICAL EXAM:    General: Alert,  cooperative, no distress, appears stated age  Head:  Normocephalic, atraumatic, mucous membranes moist  Eyes:  Conjunctiva/corneas clear, EOM's intact     Neck:  Supple,  no JVD or bruit     Lungs: Clear to auscultation bilaterally, no wheezes rhonchi rales are noted  Chest wall: No tenderness  Musculoskeletal:   Ambulates freely without assistance  Heart::  Regular rate and rhythm, S1 and S2 normal, no murmur, rub or gallop  Abdomen: Soft, non-tender, nondistended, bowel sounds active, no abdominal bruit  Extremities: No cyanosis, clubbing, or edema   Pulses: 2+ and symmetric all extremities  Skin:  No rashes or lesions  Neuro/psych: A&O x3. CN II through XII are grossly intact with appropriate affect        Past Medical History:   Diagnosis Date   • [place]    • Blood clotting disorder (HCC)     Genetic, Factor 5   • DVT (deep venous thrombosis) (HCC)    • Hyperlipidemia    • Stroke (HCC)     TIA       Past Surgical History:   Procedure Laterality Date   •  SECTION           Current Outpatient Medications:   •  atorvastatin (LIPITOR) 10 MG tablet, Take 1 tablet by mouth Daily., Disp: 30 tablet, Rfl: 3  •  cetirizine (zyrTEC) 10 MG tablet, Take 10 mg by mouth Daily., Disp: , Rfl:   •  Misc Natural Products (OSTEO BI-FLEX TRIPLE STRENGTH) tablet, OSTEO BI-FLEX TRIPLE STRENGTH TABS, Disp: , Rfl:   •  Multiple Vitamin (MULTIVITAMIN) capsule, MULTIVITAMINS CAPS, Disp: , Rfl:   •  rivaroxaban (XARELTO) 20 MG tablet, Take 1 tablet by mouth Daily With Dinner., Disp: 90 tablet, Rfl: 3  •  Vitamin C-Vitamin D-Zinc (D3/VITAMIN C/ZINC) tablet, D3/VITAMIN C/ZINC TABS, Disp: , Rfl:     No Known Allergies    Family History   Problem Relation Age of Onset   • Lung cancer Father    • Clotting disorder Father    • Thyroid disease Sister    • Atrial fibrillation Mother        Social History     Tobacco Use   • Smoking status: Never Smoker   • Smokeless tobacco: Never Used   Substance Use Topics   • Alcohol use: Yes      "Comment: Rare           Current Electrocardiogram:    ECG 12 Lead    Date/Time: 8/22/2022 12:38 PM  Performed by: Genoveva Morales APRN  Authorized by: Genoveva Morales APRN   Comparison: not compared with previous ECG   Rhythm: sinus rhythm  BPM: 93                  EMR Dragon/Transcription:   \"Dictated utilizing Dragon dictation\".       "

## 2022-11-14 RX ORDER — RIVAROXABAN 20 MG/1
TABLET, FILM COATED ORAL
Qty: 90 TABLET | Refills: 2 | Status: SHIPPED | OUTPATIENT
Start: 2022-11-14